# Patient Record
Sex: FEMALE | URBAN - METROPOLITAN AREA
[De-identification: names, ages, dates, MRNs, and addresses within clinical notes are randomized per-mention and may not be internally consistent; named-entity substitution may affect disease eponyms.]

---

## 2017-04-04 ENCOUNTER — EMERGENCY (EMERGENCY)
Facility: HOSPITAL | Age: 27
LOS: 1 days | Discharge: LEFT BEFORE DISCHARGE | End: 2017-04-04
Attending: EMERGENCY MEDICINE | Admitting: EMERGENCY MEDICINE

## 2017-04-04 VITALS
SYSTOLIC BLOOD PRESSURE: 109 MMHG | TEMPERATURE: 98 F | RESPIRATION RATE: 18 BRPM | OXYGEN SATURATION: 100 % | HEART RATE: 80 BPM | DIASTOLIC BLOOD PRESSURE: 73 MMHG

## 2017-04-04 DIAGNOSIS — M79.89 OTHER SPECIFIED SOFT TISSUE DISORDERS: ICD-10-CM

## 2017-04-04 NOTE — ED ADULT NURSE NOTE - OBJECTIVE STATEMENT
pt here for left wrist pain and limited rom. pt was snowboarding yesterday and twisted wrist. with pain/swelling/ecchymosis. will cont to monitor.

## 2020-12-29 NOTE — ED ADULT NURSE NOTE - PMH
Pt. Pharmacy reached out to us stating that medicare will not cover the Vial of Novolog. They need a new Prescription to be sent over to them for Novolog Flex touch pen with the same sig of '' Inject 8-10 Units under the skin 3 times a day before meals'' so medicare can cover it.    <<----- Click to add NO pertinent Past Medical History No pertinent past medical history